# Patient Record
Sex: FEMALE | Race: WHITE | NOT HISPANIC OR LATINO | Employment: FULL TIME | URBAN - METROPOLITAN AREA
[De-identification: names, ages, dates, MRNs, and addresses within clinical notes are randomized per-mention and may not be internally consistent; named-entity substitution may affect disease eponyms.]

---

## 2017-01-30 ENCOUNTER — ALLSCRIPTS OFFICE VISIT (OUTPATIENT)
Dept: OTHER | Facility: OTHER | Age: 67
End: 2017-01-30

## 2017-01-30 DIAGNOSIS — Z13.820 ENCOUNTER FOR SCREENING FOR OSTEOPOROSIS: ICD-10-CM

## 2017-01-30 DIAGNOSIS — Z12.31 ENCOUNTER FOR SCREENING MAMMOGRAM FOR MALIGNANT NEOPLASM OF BREAST: ICD-10-CM

## 2017-02-16 ENCOUNTER — GENERIC CONVERSION - ENCOUNTER (OUTPATIENT)
Dept: OTHER | Facility: OTHER | Age: 67
End: 2017-02-16

## 2018-01-13 VITALS
BODY MASS INDEX: 28.7 KG/M2 | SYSTOLIC BLOOD PRESSURE: 128 MMHG | WEIGHT: 152 LBS | HEIGHT: 61 IN | DIASTOLIC BLOOD PRESSURE: 78 MMHG

## 2018-01-15 NOTE — MISCELLANEOUS
Message   Recorded as Task   Date: 02/16/2016 09:55 AM, Created By: Shen Carmen   Task Name: Care Coordination   Assigned To: Tennille Caceres   Regarding Patient: Schuyler Bridges, Status: In Progress   ToyMiguel Scott - 16 Feb 2016 9:55 AM     TASK CREATED  Caller: Amanda; Other  [facility] in regards to pts rx fo   Estrace-  the [facility] they are asking if it can be transferred to them @ [facility]    phone: [phone]  order number  [id number]    FAX:[phone]   Kimberly Oliva - 16 Feb 2016 10:11 AM     TASK IN PROGRESS   Kimberly Oliva - 16 Feb 2016 10:19 AM     TASK EDITED  rx corrected to read 3 tubes estrace cream with no refills for a 90 day supply        Active Problems    1  Encounter for gynecological examination without abnormal finding (V72 31) (Z01 419)   2  Encounter for routine gynecological examination (V72 31) (Z01 419)   3  Encounter for screening mammogram for malignant neoplasm of breast (V76 12)   (Z12 31)   4  Screening for HPV (human papillomavirus) (V73 81) (Z11 51)   5  Screening for osteoporosis (V82 81) (Z13 820)   6  Vaginitis, atrophic (627 3) (N95 2)   7  Visit for screening mammogram (V76 12) (Z12 31)    Current Meds   1  Calcium TABS; Therapy: (Recorded:09Mau0826) to Recorded   2  Carvedilol 12 5 MG Oral Tablet; Therapy: 31UED1657 to (Last Rx:26Dhs0666)  Requested for: 73Rif8484 Ordered   3  Cymbalta 60 MG Oral Capsule Delayed Release Particles (DULoxetine HCl); Therapy: 08JMB7455 to (Last Rx:71Tcg5166)  Requested for: 33ABZ6543 Ordered   4  Estrace 0 1 MG/GM Vaginal Cream; USE AS DIRECTED; Therapy: 86QFV1556 to (Last Rx:81Ree9713)  Requested for: 00Gvy7371 Ordered   5  OLANZapine 2 5 MG Oral Tablet; Therapy: (Recorded:01Qks0868) to Recorded   6  Pravastatin Sodium 40 MG Oral Tablet; Therapy: 49HYJ2418 to (Last Rx:52Bhk6879)  Requested for: 78Usx3644 Ordered   7  SB Low Dose ASA EC 81 MG Oral Tablet Delayed Release;    Therapy: (Recorded:08May2014) to Recorded   8  Vitamin D 1000 UNIT Oral Tablet; Therapy: (Recorded:08May2014) to Recorded   9  V-R Vitamin C TABS; Therapy: (Recorded:08May2014) to Recorded    Allergies    1  Hepatitis B    2   IVP Dye    Signatures   Electronically signed by : Maria Victoria Becerra, ; Feb 16 2016 10:19AM EST                       (Author)

## 2018-01-15 NOTE — RESULT NOTES
Message   Recorded as Task   Date: 02/24/2017 11:22 AM, Created By: Leticia Palmer   Task Name: Verify Doc   Assigned To: Terri Anguiano   Regarding Patient: Marisol Zhu, Status: Active   CommentCarlyle Creeks - 24 Feb 2017 11:22 AM     Verify task generated in Scan batch basket  Terri Anguiano - 27 Feb 2017 1:14 PM     TASK EDITED  Card was sent a DEXA scan was normal        Signatures   Electronically signed by : Meena Camargo CNM;  Feb 27 2017  1:15PM EST                       (Author)

## 2018-04-12 ENCOUNTER — ANNUAL EXAM (OUTPATIENT)
Dept: OBGYN CLINIC | Facility: MEDICAL CENTER | Age: 68
End: 2018-04-12
Payer: MEDICARE

## 2018-04-12 VITALS
BODY MASS INDEX: 30.58 KG/M2 | DIASTOLIC BLOOD PRESSURE: 70 MMHG | HEIGHT: 61 IN | WEIGHT: 162 LBS | SYSTOLIC BLOOD PRESSURE: 122 MMHG

## 2018-04-12 DIAGNOSIS — Z12.39 ENCOUNTER FOR SCREENING FOR MALIGNANT NEOPLASM OF BREAST: ICD-10-CM

## 2018-04-12 DIAGNOSIS — M85.80 OSTEOPENIA, UNSPECIFIED LOCATION: ICD-10-CM

## 2018-04-12 DIAGNOSIS — Z01.419 ENCOUNTER FOR GYNECOLOGICAL EXAMINATION WITHOUT ABNORMAL FINDING: Primary | ICD-10-CM

## 2018-04-12 PROBLEM — F32.A DEPRESSION: Status: ACTIVE | Noted: 2018-04-12

## 2018-04-12 PROCEDURE — G0101 CA SCREEN;PELVIC/BREAST EXAM: HCPCS | Performed by: NURSE PRACTITIONER

## 2018-04-12 RX ORDER — PRAVASTATIN SODIUM 40 MG
40 TABLET ORAL
Refills: 2 | COMMUNITY
Start: 2018-03-20

## 2018-04-12 RX ORDER — DULOXETIN HYDROCHLORIDE 60 MG/1
CAPSULE, DELAYED RELEASE ORAL
COMMUNITY
Start: 2018-03-30

## 2018-04-12 RX ORDER — CARVEDILOL 12.5 MG/1
12.5 TABLET ORAL DAILY
Refills: 1 | COMMUNITY
Start: 2018-02-11

## 2018-04-12 RX ORDER — OLANZAPINE 2.5 MG/1
TABLET ORAL
COMMUNITY

## 2018-04-12 NOTE — PROGRESS NOTES
[facility]  ANNU[facility] EXAM  Jennifer Zafar 79 y o  SUBJECTIVE    HPI: Jennifer Zafar is a 79 y o   female presenting today for annual GYN exam  Her last gynecologic exam was in  at our practice  Postmenopausal  Patient denies post-menopausal vaginal bleeding  Sexually active with 1 Male partner, monogamous relationship  Declined STI testing today    Denies sexual concerns  Has no breast, bowel, bladder, or vaginal concerns  Works as a personal care aid  Gynecologic History  Last pap smear: Date: 2016 and Result: NILM, HPV(-)  History of abnormal pap smears: patient reports >20y ago  Last mammogram: 2017, BI RADS - 2  Patient reports that she had her annual mammo in 2018 at [facility] and was told that it was normal  It is not on file for viewing today    Contraceptive method: Postmenopausal  Dyspareunia: denies      Obstetric History  [id number]   x2    Health Maintenance  Self-breast exams: no  Calcium & Vitamin D: discussed and written information provided  Tobacco cessation: N/A  DEXA bone density scan: 2017 showed Osteopenia with recommendation for repeat BMD in 2y  Colonoscopy: 10/2017    The following portions of the patient's history were reviewed and updated as appropriate: allergies, current medications, past family history, past medical history, past social history, past surgical history and problem list     ROS  General ROS: negative for chills or fever  Breast ROS: negative for breast lumps  Respiratory ROS: no cough, shortness of breath, or wheezing  Cardiovascular ROS: no chest pain or dyspnea on exertion  Gastrointestinal ROS: no abdominal pain, change in bowel habits, or black or bloody stools  Genito-Urinary ROS: no dysuria, trouble voiding, or hematuria  Neurological ROS: negative    OBJECTIVE  Vitals:    18 0912   BP: 122/70   BP Location: Left arm   Patient Position: Sitting   Weight: 73 5 kg (162 lb) Height: 5' 1" (1 549 m)       Physical Exam   Constitutional: She is oriented to person, place, and time  Vital signs are normal  She appears well-developed and well-nourished  Genitourinary: Vagina normal and uterus normal  Pelvic exam was performed with patient supine  There is no rash, tenderness or lesion on the right labia  There is no rash, tenderness or lesion on the left labia  No erythema in the vagina  No vaginal discharge found  Right adnexum does not display mass and does not display tenderness  Left adnexum does not display mass and does not display tenderness  Cervix does not exhibit motion tenderness  Uterus is not tender  Genitourinary Comments: Atrophic genitalia  Cervix is almost flush with the wall of the uterus  Loss of vaginal rugae  No prolapse noted  HENT:   Head: Normocephalic and atraumatic  Neck: No thyromegaly present  Cardiovascular: Normal heart sounds  Pulmonary/Chest: Effort normal and breath sounds normal  Right breast exhibits no inverted nipple, no mass, no nipple discharge, no skin change and no tenderness  Left breast exhibits no inverted nipple, no mass, no nipple discharge, no skin change and no tenderness  Breasts are symmetrical    Scattered small cherry angiomas across chest    Abdominal: Soft  Normal appearance  Musculoskeletal: Normal range of motion  Lymphadenopathy:     She has no axillary adenopathy  Neurological: She is alert and oriented to person, place, and time  Skin: Skin is warm, dry and intact  Psychiatric: She has a normal mood and affect  Her behavior is normal    Vitals reviewed  ASSESSMENT  Normal postmenopausal female exam  Known osteopenia        Discussion/Counseling  At todays visit I discussed the importance of self-breast exams, exercise and healthy diet  The recommendation is for annual mammograms   Reviewed ASCCP guidelines for cervical cancer screening, which include discontinuing screening after age 72 in the presence of a normal pap smear history  Safe sex practices were strongly encouraged to protect against sexually transmitted infections  Importance of vitamin D and adequate calcium intake was reviewed  All questions were answered to her apparent satisfaction  The patients DEXA bone density scan results were reviewed as well as the morbidity and mortality associated with osteopenia and osteoporosis  I have instructed her to begin taking calcium 1,200mg daily in divided doses along with 800-1,000IU of vitamin D, combined with adequate weight-bearing exercise  Treatment options were reviewed, including oral bisphosphonates, Reclast, HRT, and Evista  The risks, benefits, and side effects of each method were reviewed in detail  All questions were answered to her satisfaction  PLAN  1  Order for annual mammogram provided to patient at todays visit  2  Calcium & Vitamin D supplementation  Weight-bearing exercises  Repeat DEXA scan in February 2019   3  Denies history of abnormal pap smears  No further screening indicated due to age per ASCCP guidelines  4  Return annually for routine GYN exams        All questions were answered & Jakob Bynum expressed understanding        [facility]

## 2020-11-20 DIAGNOSIS — Z12.39 ENCOUNTER FOR SCREENING FOR MALIGNANT NEOPLASM OF BREAST: ICD-10-CM

## 2022-04-20 ENCOUNTER — APPOINTMENT (OUTPATIENT)
Dept: LAB | Facility: CLINIC | Age: 72
End: 2022-04-20
Payer: MEDICARE

## 2022-04-20 DIAGNOSIS — D75.839 THROMBOCYTHEMIA: ICD-10-CM

## 2022-04-20 DIAGNOSIS — E78.5 HYPERLIPOPROTEINEMIA: ICD-10-CM

## 2022-04-20 DIAGNOSIS — E55.9 AVITAMINOSIS D: ICD-10-CM

## 2022-04-20 LAB
25(OH)D3 SERPL-MCNC: 51.7 NG/ML (ref 30–100)
ALBUMIN SERPL BCP-MCNC: 3.9 G/DL (ref 3.5–5)
ALP SERPL-CCNC: 99 U/L (ref 46–116)
ALT SERPL W P-5'-P-CCNC: 27 U/L (ref 12–78)
ANION GAP SERPL CALCULATED.3IONS-SCNC: 3 MMOL/L (ref 4–13)
AST SERPL W P-5'-P-CCNC: 18 U/L (ref 5–45)
BASOPHILS # BLD AUTO: 0.06 THOUSANDS/ΜL (ref 0–0.1)
BASOPHILS NFR BLD AUTO: 1 % (ref 0–1)
BILIRUB SERPL-MCNC: 0.46 MG/DL (ref 0.2–1)
BUN SERPL-MCNC: 18 MG/DL (ref 5–25)
CALCIUM SERPL-MCNC: 9.9 MG/DL (ref 8.3–10.1)
CHLORIDE SERPL-SCNC: 107 MMOL/L (ref 100–108)
CHOLEST SERPL-MCNC: 200 MG/DL
CO2 SERPL-SCNC: 29 MMOL/L (ref 21–32)
CREAT SERPL-MCNC: 0.97 MG/DL (ref 0.6–1.3)
EOSINOPHIL # BLD AUTO: 0.38 THOUSAND/ΜL (ref 0–0.61)
EOSINOPHIL NFR BLD AUTO: 4 % (ref 0–6)
ERYTHROCYTE [DISTWIDTH] IN BLOOD BY AUTOMATED COUNT: 14.1 % (ref 11.6–15.1)
GFR SERPL CREATININE-BSD FRML MDRD: 59 ML/MIN/1.73SQ M
GLUCOSE P FAST SERPL-MCNC: 116 MG/DL (ref 65–99)
HCT VFR BLD AUTO: 45.7 % (ref 34.8–46.1)
HDLC SERPL-MCNC: 47 MG/DL
HGB BLD-MCNC: 14.6 G/DL (ref 11.5–15.4)
IMM GRANULOCYTES # BLD AUTO: 0.03 THOUSAND/UL (ref 0–0.2)
IMM GRANULOCYTES NFR BLD AUTO: 0 % (ref 0–2)
LDLC SERPL CALC-MCNC: 120 MG/DL (ref 0–100)
LYMPHOCYTES # BLD AUTO: 2.45 THOUSANDS/ΜL (ref 0.6–4.47)
LYMPHOCYTES NFR BLD AUTO: 27 % (ref 14–44)
MCH RBC QN AUTO: 28.3 PG (ref 26.8–34.3)
MCHC RBC AUTO-ENTMCNC: 31.9 G/DL (ref 31.4–37.4)
MCV RBC AUTO: 89 FL (ref 82–98)
MONOCYTES # BLD AUTO: 0.9 THOUSAND/ΜL (ref 0.17–1.22)
MONOCYTES NFR BLD AUTO: 10 % (ref 4–12)
NEUTROPHILS # BLD AUTO: 5.34 THOUSANDS/ΜL (ref 1.85–7.62)
NEUTS SEG NFR BLD AUTO: 58 % (ref 43–75)
NONHDLC SERPL-MCNC: 153 MG/DL
NRBC BLD AUTO-RTO: 0 /100 WBCS
PLATELET # BLD AUTO: 473 THOUSANDS/UL (ref 149–390)
PMV BLD AUTO: 9.4 FL (ref 8.9–12.7)
POTASSIUM SERPL-SCNC: 4.3 MMOL/L (ref 3.5–5.3)
PROT SERPL-MCNC: 7.6 G/DL (ref 6.4–8.2)
RBC # BLD AUTO: 5.16 MILLION/UL (ref 3.81–5.12)
SODIUM SERPL-SCNC: 139 MMOL/L (ref 136–145)
TRIGL SERPL-MCNC: 165 MG/DL
WBC # BLD AUTO: 9.16 THOUSAND/UL (ref 4.31–10.16)

## 2022-04-20 PROCEDURE — 85025 COMPLETE CBC W/AUTO DIFF WBC: CPT

## 2022-04-20 PROCEDURE — 36415 COLL VENOUS BLD VENIPUNCTURE: CPT

## 2022-04-20 PROCEDURE — 82306 VITAMIN D 25 HYDROXY: CPT

## 2022-04-20 PROCEDURE — 80061 LIPID PANEL: CPT

## 2022-04-20 PROCEDURE — 80053 COMPREHEN METABOLIC PANEL: CPT

## 2022-09-26 ENCOUNTER — TELEPHONE (OUTPATIENT)
Dept: GASTROENTEROLOGY | Facility: CLINIC | Age: 72
End: 2022-09-26

## 2022-09-26 NOTE — TELEPHONE ENCOUNTER
Recall ltr for colon with Dr Juan A Hernandez due to hx of tva, hyperplastic polyps due on 10/10/22

## 2022-11-09 ENCOUNTER — PREP FOR PROCEDURE (OUTPATIENT)
Dept: GASTROENTEROLOGY | Facility: CLINIC | Age: 72
End: 2022-11-09

## 2022-11-09 ENCOUNTER — TELEPHONE (OUTPATIENT)
Dept: GASTROENTEROLOGY | Facility: CLINIC | Age: 72
End: 2022-11-09

## 2022-11-09 DIAGNOSIS — Z86.010 HISTORY OF COLON POLYPS: Primary | ICD-10-CM

## 2022-11-09 NOTE — TELEPHONE ENCOUNTER
11/09/22  Screened by: Roxanne Galeana    Referring Provider     Pre- Screening: There is no height or weight on file to calculate BMI  Has patient been referred for a routine screening Colonoscopy? yes  Is the patient between 39-70 years old? yes      Previous Colonoscopy yes   If yes:    Date: 10 yrs ago    Facility:     Reason:       SCHEDULING STAFF: If the patient is between 45yrs-49yrs, please advise patient to confirm benefits/coverage with their insurance company for a routine screening colonoscopy, some insurance carriers will only cover at [facility] or [facility]  If the patient is over 66years old, please schedule an office visit  Does the patient want to see a Gastroenterologist prior to their procedure OR are they having any GI symptoms? no    Has the patient been hospitalized or had abdominal surgery in the past 6 months? no    Does the patient use supplemental oxygen? no    Does the patient take Coumadin, Lovenox, Plavix, Elliquis, Xarelto, or other blood thinning medication? no    Has the patient had a stroke, cardiac event, or stent placed in the past year? no     PT PASSED OA  SCHEDULING STAFF: If patient answers NO to above questions, then schedule procedure  If patient answers YES to above questions, then schedule office appointment  If patient is between 45yrs - 49yrs, please advise patient that we will have to confirm benefits & coverage with their insurance company for a routine screening colonoscopy

## 2022-11-09 NOTE — TELEPHONE ENCOUNTER
Scheduled date of colonoscopy (as of today): 12/14/2022    Physician performing colonoscopy: Dr Jenna Jacobson    Location of colonoscopy: [facility]    Bowel prep reviewed with patient: Miralax/ Dulcolax    Instructions reviewed with patient by: AS    Clearances: N/A

## 2022-11-09 NOTE — TELEPHONE ENCOUNTER
Adria Canela 27 Assessment    Name: Fannie Flores  YOB: 1950  Last Height: 5' 2"  Last weight: 170 lb  BMI: None  Procedure: Colon  Diagnosis: Hx of polyps  Date of procedure: 12/14/2022  Prep: Miralax/Dulcolax  Responsible : Romaine Milton (daughter)  Phone#: [phone]  Name completing form: Cesar Morris  Date form completed: 11/09/22      If the patient answers yes to any of these questions, schedule in a hospital  Are you pregnant: No  Do you rely on a wheelchair for mobility: No  Have you been diagnosed with End Stage Renal Disease (ESRD): No  Do you need oxygen during the day: No  Have you had a heart attack or stroke within the past three months: No  Have you had a seizure within the past three months: No  Have you ever been informed by anesthesia that you have a difficult airway: No  Additional Questions  Have you had any cardiac testing or are under the care of a Cardiologist (see cardiac list): No  Cardiac list:   Do you have an implanted cardiac defibrillator: No (Comment: This patient should be scheduled in the hospital)    Have any bleeding problems, such as anemia or hemophilia (If patient has H&H result below 8, schedule in hospital   H&H must be within 30 days of procedure): No    Had an organ transplant within the past 3 months: No    Do you have any present infections: Yes  Do you get short of breath when walking a few blocks: No  Have you been diagnosed with diabetes:   Comments (provide cardiac provider information if applicable): Pt has infection in tooth  Pt states she will be finished with her antiobiotics in 2 days

## 2022-12-05 ENCOUNTER — TELEPHONE (OUTPATIENT)
Dept: GASTROENTEROLOGY | Facility: CLINIC | Age: 72
End: 2022-12-05

## 2022-12-07 NOTE — TELEPHONE ENCOUNTER
lmom confirming pt's colonoscopy scheduled on 12/14/22 at [facility] with Dr Loco Mcclure   Informed [facility] would be calling 1-2 days prior with the arrival time  Informed of clear liquid diet day prior as well as the bowel cleansing preparation  Informed would need a  the day of the procedure due to being under sedation  I asked pt to please call back if has not received instructions or if has any questions

## 2022-12-14 ENCOUNTER — ANESTHESIA EVENT (OUTPATIENT)
Dept: GASTROENTEROLOGY | Facility: AMBULATORY SURGERY CENTER | Age: 72
End: 2022-12-14

## 2022-12-14 ENCOUNTER — ANESTHESIA (OUTPATIENT)
Dept: GASTROENTEROLOGY | Facility: AMBULATORY SURGERY CENTER | Age: 72
End: 2022-12-14

## 2022-12-14 ENCOUNTER — HOSPITAL ENCOUNTER (OUTPATIENT)
Dept: GASTROENTEROLOGY | Facility: AMBULATORY SURGERY CENTER | Age: 72
Discharge: HOME | End: 2022-12-14

## 2022-12-14 VITALS
HEIGHT: 62 IN | TEMPERATURE: 97.9 F | WEIGHT: 170 LBS | HEART RATE: 63 BPM | DIASTOLIC BLOOD PRESSURE: 64 MMHG | RESPIRATION RATE: 18 BRPM | OXYGEN SATURATION: 97 % | BODY MASS INDEX: 31.28 KG/M2 | SYSTOLIC BLOOD PRESSURE: 155 MMHG

## 2022-12-14 DIAGNOSIS — Z86.010 HISTORY OF COLON POLYPS: ICD-10-CM

## 2022-12-14 RX ORDER — SODIUM CHLORIDE, SODIUM LACTATE, POTASSIUM CHLORIDE, CALCIUM CHLORIDE 600; 310; 30; 20 MG/100ML; MG/100ML; MG/100ML; MG/100ML
100 INJECTION, SOLUTION INTRAVENOUS CONTINUOUS
Status: CANCELLED | OUTPATIENT
Start: 2022-12-14

## 2022-12-14 RX ORDER — PROPOFOL 10 MG/ML
INJECTION, EMULSION INTRAVENOUS AS NEEDED
Status: DISCONTINUED | OUTPATIENT
Start: 2022-12-14 | End: 2022-12-14

## 2022-12-14 RX ORDER — ALPRAZOLAM 0.5 MG/1
0.5 TABLET ORAL 3 TIMES DAILY PRN
COMMUNITY
Start: 2022-11-28

## 2022-12-14 RX ORDER — LIDOCAINE HYDROCHLORIDE 10 MG/ML
INJECTION, SOLUTION EPIDURAL; INFILTRATION; INTRACAUDAL; PERINEURAL AS NEEDED
Status: DISCONTINUED | OUTPATIENT
Start: 2022-12-14 | End: 2022-12-14

## 2022-12-14 RX ORDER — SODIUM CHLORIDE, SODIUM LACTATE, POTASSIUM CHLORIDE, CALCIUM CHLORIDE 600; 310; 30; 20 MG/100ML; MG/100ML; MG/100ML; MG/100ML
100 INJECTION, SOLUTION INTRAVENOUS CONTINUOUS
Status: DISCONTINUED | OUTPATIENT
Start: 2022-12-14 | End: 2022-12-18 | Stop reason: HOSPADM

## 2022-12-14 RX ADMIN — PROPOFOL 30 MG: 10 INJECTION, EMULSION INTRAVENOUS at 11:12

## 2022-12-14 RX ADMIN — PROPOFOL 30 MG: 10 INJECTION, EMULSION INTRAVENOUS at 11:02

## 2022-12-14 RX ADMIN — PROPOFOL 100 MG: 10 INJECTION, EMULSION INTRAVENOUS at 10:53

## 2022-12-14 RX ADMIN — PROPOFOL 40 MG: 10 INJECTION, EMULSION INTRAVENOUS at 10:57

## 2022-12-14 RX ADMIN — LIDOCAINE HYDROCHLORIDE 50 MG: 10 INJECTION, SOLUTION EPIDURAL; INFILTRATION; INTRACAUDAL; PERINEURAL at 10:53

## 2022-12-14 RX ADMIN — PROPOFOL 40 MG: 10 INJECTION, EMULSION INTRAVENOUS at 11:07

## 2022-12-14 RX ADMIN — SODIUM CHLORIDE, SODIUM LACTATE, POTASSIUM CHLORIDE, CALCIUM CHLORIDE: 600; 310; 30; 20 INJECTION, SOLUTION INTRAVENOUS at 10:50

## 2022-12-14 NOTE — ANESTHESIA PREPROCEDURE EVALUATION
Procedure:  COLONOSCOPY    Relevant Problems   NEURO/PSYCH   (+) Depression        Physical Exam    Airway    Mallampati score: III  TM Distance: >3 FB  Neck ROM: full     Dental   No notable dental hx     Cardiovascular  Cardiovascular exam normal    Pulmonary  Pulmonary exam normal     Other Findings        Anesthesia Plan  ASA Score- 2     Anesthesia Type- IV sedation with anesthesia with ASA Monitors  Additional Monitors:   Airway Plan:           Plan Factors-Exercise tolerance (METS): >4 METS  Chart reviewed  EKG reviewed  Existing labs reviewed  Patient summary reviewed  Patient is not a current smoker  Induction-     Postoperative Plan-     Informed Consent- Anesthetic plan and risks discussed with patient  I personally reviewed this patient with the CRNA  Discussed and agreed on the Anesthesia Plan with the CRNA  Shabana Rodriguez

## 2022-12-14 NOTE — H&P
History and Physical - [facility]  Carlos Charlton 81 y o  female MRN: [id number]                  HPI: Carlos Charlton is a 81y o  year old female who presents for history of polyp      REVIEW OF SYSTEMS: Per the HPI, and otherwise unremarkable      Historical Information   Past Medical History:   Diagnosis Date   • Anxiety    • Arthritis     B/L knees   • Colon polyp    • Female cystocele    • Gallbladder disease    • Hyperlipidemia    • Hypertension    • Rectal polyp      Past Surgical History:   Procedure Laterality Date   • APPENDECTOMY     • BLADDER SUSPENSION     • CHOLECYSTECTOMY     • COLONOSCOPY     • TUBAL LIGATION       Social History   Social History     Substance and Sexual Activity   Alcohol Use Yes   • Alcohol/week: 2 0 - 3 0 standard drinks   • Types: 2 - 3 Glasses of wine per week    Comment: wine     Social History     Substance and Sexual Activity   Drug Use No     Social History     Tobacco Use   Smoking Status Never   Smokeless Tobacco Never     Family History   Problem Relation Age of Onset   • No Known Problems Mother    • No Known Problems Father    • Lung cancer Sister    • Heart failure Brother    • No Known Problems Maternal Grandmother    • No Known Problems Maternal Grandfather    • No Known Problems Paternal Grandmother    • No Known Problems Paternal Grandfather    • Diabetes Family    • Heart disease Family    • Hypertension Family    • Kidney disease Family    • Thyroid disease Family        Meds/Allergies       Current Outpatient Medications:   •  ALPRAZolam (XANAX) 0 5 mg tablet  •  Aspirin (ASPIR-81 PO)  •  calcium carbonate 1250 MG capsule  •  carvedilol (COREG) 12 5 mg tablet  •  DULoxetine (CYMBALTA) 60 mg delayed release capsule  •  OLANZapine (ZyPREXA) 2 5 mg tablet  •  pravastatin (PRAVACHOL) 40 mg tablet    Current Facility-Administered Medications:   •  lactated ringers infusion, 100 mL/hr, Intravenous, Continuous    Allergies   Allergen Reactions   • Hepatitis B Immune Globulin Hives   • Iodinated Diagnostic Agents Hives       Objective     /82   Pulse 69   Temp 97 9 °F (36 6 °C) (Skin)   Resp 18   Ht 5' 2" (1 575 m)   Wt 77 1 kg (170 lb)   SpO2 98%   BMI 31 09 kg/m²       PHYSICAL EXAM    Gen: NAD  Head: NCAT  CV: RRR  CHEST: Clear  ABD: soft, NT/ND  EXT: no edema      ASSESSMENT/PLAN:  This is a 81y o  year old female here for colonoscopy, and she is stable and optimized for her procedure

## 2022-12-14 NOTE — ANESTHESIA POSTPROCEDURE EVALUATION
Post-Op Assessment Note    CV Status:  Stable  Pain Score: 0    Pain management: adequate     Mental Status:  Alert and awake   Hydration Status:  Euvolemic and stable   PONV Controlled:  Controlled   Airway Patency:  Patent      Post Op Vitals Reviewed: Yes      Staff: CRNA         No notable events documented      /80 (12/14/22 1118)    Temp      Pulse 75 (12/14/22 1118)   Resp 18 (12/14/22 1118)    SpO2 96 % (12/14/22 1118)

## 2023-07-25 ENCOUNTER — OFFICE VISIT (OUTPATIENT)
Dept: OBGYN CLINIC | Facility: MEDICAL CENTER | Age: 73
End: 2023-07-25
Payer: MEDICARE

## 2023-07-25 VITALS
SYSTOLIC BLOOD PRESSURE: 132 MMHG | DIASTOLIC BLOOD PRESSURE: 88 MMHG | WEIGHT: 174 LBS | BODY MASS INDEX: 32.85 KG/M2 | HEIGHT: 61 IN

## 2023-07-25 DIAGNOSIS — Z78.0 ENCOUNTER FOR OSTEOPOROSIS SCREENING IN ASYMPTOMATIC POSTMENOPAUSAL PATIENT: ICD-10-CM

## 2023-07-25 DIAGNOSIS — N39.3 SUI (STRESS URINARY INCONTINENCE, FEMALE): ICD-10-CM

## 2023-07-25 DIAGNOSIS — Z12.31 ENCOUNTER FOR SCREENING MAMMOGRAM FOR MALIGNANT NEOPLASM OF BREAST: ICD-10-CM

## 2023-07-25 DIAGNOSIS — Z13.820 ENCOUNTER FOR OSTEOPOROSIS SCREENING IN ASYMPTOMATIC POSTMENOPAUSAL PATIENT: ICD-10-CM

## 2023-07-25 DIAGNOSIS — Z12.4 ENCOUNTER FOR PAPANICOLAOU SMEAR FOR CERVICAL CANCER SCREENING: ICD-10-CM

## 2023-07-25 DIAGNOSIS — Z01.411 ENCNTR FOR GYN EXAM (GENERAL) (ROUTINE) W ABNORMAL FINDINGS: Primary | ICD-10-CM

## 2023-07-25 PROCEDURE — G0145 SCR C/V CYTO,THINLAYER,RESCR: HCPCS | Performed by: NURSE PRACTITIONER

## 2023-07-25 PROCEDURE — G0101 CA SCREEN;PELVIC/BREAST EXAM: HCPCS | Performed by: NURSE PRACTITIONER

## 2023-07-25 NOTE — PROGRESS NOTES
Assessment/Plan:  Calcium 1200-1500mg (in divided doses-max 600 mg at one time) + 800-1000 IU Vit D daily unless otherwise directed. Avoid falls. Exercise 150 minutes per week minimum including weight bearing exercises. DEXA scan- ordered. Pap completed today. No further paps after this pap as long as there has been adequate normal paps completed, no history of RENE 2  or a more severe pap diagnosis in the last 20 years. Annual mammogram ordered and monthly breast self exam recommended . Colonoscopy-   Due         Kegels 20 times twice daily. UA CS ordered. States hx of hematuria. Vaginal moisturizers twice weekly as needed. Return to office in one year or sooner, if needed. 1. Encntr for gyn exam (general) (routine) w abnormal findings    2. Encounter for Papanicolaou smear for cervical cancer screening  -     Liquid-based pap, screening    3. Encounter for screening mammogram for malignant neoplasm of breast  -     Mammo screening bilateral w 3d & cad; Future    4. Encounter for osteoporosis screening in asymptomatic postmenopausal patient  -     DXA bone density spine hip and pelvis; Future    5. CARMEN (stress urinary incontinence, female)  -     Urine culture; Future  -     Urinalysis with microscopic; Future    6. BMI 33.0-33.9,adult               Subjective:      Patient ID: Nir Verdugo is a 72 y.o. female. HPI    Nir Verdugo is a 72 y.o. [id number] ( 56 yo girl-stomach issues, 44 yo boy-back and shoulder concerns ) female who is here today for her annual visit. Noticed "white bumps" on her vulva 1-2 years ago. Non tender but would like them evaluated today. Menopausal with no vaginal bleeding. Exercise- every other day. Just restarted. Works PT as a home health aide  Nir Verdugo is not sexually active as she is  from her 2nd  since 2. Her last partner  3 years ago. Denies vaginal pain,bleeding or dryness.     She uses tubal ligation/menopause  for contraception. She is not interested in STD screening today. She denies vaginal discharge, itching or pelvic pain. She has no urinary concerns, does have CARMEN. States hx of hematuria. No bowel concerns. No breast concerns. Last pap: 1/25/16 normal pap with negative HR HPV. No abnormal pap in last 15-20 years. Possibly not consistent paps. DEXA scan: 2/2017 noted in provider note stating osteopenia. Results not available. Mammogram: 11/18/2021 normal with heterogeneously dense breast tissue; states had recent normal mammogram months ago at [facility]. Colonoscopy: 12/14/2022 recall in 5 years. Family history of cancer:   Cancer-related family history includes Lung cancer in her sister. There is no history of Breast cancer, Colon cancer, Ovarian cancer, Uterine cancer, or Cervical cancer. The following portions of the patient's history were reviewed and updated as appropriate: allergies, current medications, past family history, past medical history, past social history, past surgical history and problem list.    Review of Systems   Constitutional: Negative. Negative for activity change, appetite change, chills, diaphoresis, fatigue, fever and unexpected weight change. HENT: Negative for congestion, dental problem, sneezing, sore throat and trouble swallowing. Eyes: Negative for visual disturbance. Respiratory: Negative for chest tightness and shortness of breath. Cardiovascular: Negative for chest pain and leg swelling. Gastrointestinal: Negative for abdominal pain, constipation, diarrhea, nausea and vomiting. Genitourinary: Negative for difficulty urinating, dysuria, frequency, hematuria, pelvic pain, urgency, vaginal bleeding, vaginal discharge and vaginal pain. White vulvar bumps   Musculoskeletal: Negative for back pain and neck pain. Skin: Negative. Allergic/Immunologic: Negative. Neurological: Negative for weakness and headaches. Hematological: Negative for adenopathy. Psychiatric/Behavioral: Negative. Objective:      /88 (BP Location: Left arm, Patient Position: Sitting, Cuff Size: Standard)   Ht 5' 0.5" (1.537 m)   Wt 78.9 kg (174 lb)   BMI 33.42 kg/m²          Physical Exam  Vitals and nursing note reviewed. Constitutional:       Appearance: Normal appearance. She is well-developed. She is obese. HENT:      Head: Normocephalic. Neck:      Thyroid: No thyromegaly. Cardiovascular:      Rate and Rhythm: Normal rate and regular rhythm. Heart sounds: Normal heart sounds. Pulmonary:      Effort: Pulmonary effort is normal.      Breath sounds: Normal breath sounds. Chest:   Breasts:     Breasts are symmetrical.      Right: Normal. No inverted nipple, mass, nipple discharge, skin change or tenderness. Left: Normal. No inverted nipple, mass, nipple discharge, skin change or tenderness. Abdominal:      Palpations: Abdomen is soft. Genitourinary:     General: Normal vulva. Exam position: Lithotomy position. Labia:         Right: No rash, tenderness, lesion or injury. Left: No rash, tenderness, lesion or injury. Urethra: No prolapse, urethral pain, urethral swelling or urethral lesion. Vagina: No signs of injury and foreign body. No vaginal discharge, erythema, tenderness, bleeding, lesions or prolapsed vaginal walls. Cervix: Normal.      Uterus: Normal.       Adnexa: Right adnexa normal and left adnexa normal.        Right: No mass, tenderness or fullness. Left: No mass, tenderness or fullness. Rectum: No external hemorrhoid. Comments: Vulvovaginal atrophy  Small sebaceous cysts noted on left labia majora  Musculoskeletal:         General: Normal range of motion. Cervical back: Normal range of motion. Lymphadenopathy:      Head:      Right side of head: No submental, submandibular, tonsillar or occipital adenopathy.       Left side of head: No submental, submandibular, tonsillar or occipital adenopathy. Upper Body:      Right upper body: No supraclavicular or axillary adenopathy. Left upper body: No supraclavicular or axillary adenopathy. Lower Body: No right inguinal adenopathy. No left inguinal adenopathy. Skin:     General: Skin is warm and dry. Neurological:      Mental Status: She is alert and oriented to person, place, and time. Psychiatric:         Mood and Affect: Mood normal.         Behavior: Behavior normal. Behavior is cooperative.

## 2023-07-25 NOTE — PATIENT INSTRUCTIONS
Calcium 1200-1500mg (in divided doses-max 600 mg at one time) + 800-1000 IU Vit D daily unless otherwise directed. Avoid falls. Exercise 150 minutes per week minimum including weight bearing exercises. DEXA scan- ordered. Pap completed today. No further paps after this pap as long as there has been adequate normal paps completed, no history of RENE 2  or a more severe pap diagnosis in the last 20 years. Annual mammogram ordered and monthly breast self exam recommended . Colonoscopy-   Due 2027        Kegels 20 times twice daily. UA CS ordered. States hx of hematuria. Vaginal moisturizers twice weekly as needed. Return to office in one year or sooner, if needed.

## 2023-07-31 LAB
LAB AP GYN PRIMARY INTERPRETATION: NORMAL
Lab: NORMAL

## 2023-09-29 ENCOUNTER — LAB (OUTPATIENT)
Dept: LAB | Facility: CLINIC | Age: 73
End: 2023-09-29
Payer: MEDICARE

## 2023-09-29 ENCOUNTER — TRANSCRIBE ORDERS (OUTPATIENT)
Dept: LAB | Facility: CLINIC | Age: 73
End: 2023-09-29

## 2023-09-29 DIAGNOSIS — E03.9 HYPOTHYROIDISM, ADULT: ICD-10-CM

## 2023-09-29 DIAGNOSIS — I48.91 ATRIAL FIBRILLATION, UNSPECIFIED TYPE (HCC): Primary | ICD-10-CM

## 2023-09-29 DIAGNOSIS — N39.3 SUI (STRESS URINARY INCONTINENCE, FEMALE): ICD-10-CM

## 2023-09-29 DIAGNOSIS — D75.839 THROMBOCYTOSIS: ICD-10-CM

## 2023-09-29 DIAGNOSIS — E78.5 HYPERLIPIDEMIA, UNSPECIFIED HYPERLIPIDEMIA TYPE: ICD-10-CM

## 2023-09-29 DIAGNOSIS — I48.91 ATRIAL FIBRILLATION, UNSPECIFIED TYPE (HCC): ICD-10-CM

## 2023-09-29 LAB
ALBUMIN SERPL BCP-MCNC: 4.1 G/DL (ref 3.5–5)
ALP SERPL-CCNC: 91 U/L (ref 34–104)
ALT SERPL W P-5'-P-CCNC: 35 U/L (ref 7–52)
ANION GAP SERPL CALCULATED.3IONS-SCNC: 6 MMOL/L
AST SERPL W P-5'-P-CCNC: 36 U/L (ref 13–39)
BACTERIA UR QL AUTO: NORMAL /HPF
BASOPHILS # BLD AUTO: 0.07 THOUSANDS/ÂΜL (ref 0–0.1)
BASOPHILS NFR BLD AUTO: 1 % (ref 0–1)
BILIRUB SERPL-MCNC: 0.43 MG/DL (ref 0.2–1)
BILIRUB UR QL STRIP: NEGATIVE
BUN SERPL-MCNC: 19 MG/DL (ref 5–25)
CALCIUM SERPL-MCNC: 10 MG/DL (ref 8.4–10.2)
CHLORIDE SERPL-SCNC: 104 MMOL/L (ref 96–108)
CHOLEST SERPL-MCNC: 185 MG/DL
CLARITY UR: CLEAR
CO2 SERPL-SCNC: 30 MMOL/L (ref 21–32)
COLOR UR: NORMAL
CREAT SERPL-MCNC: 0.95 MG/DL (ref 0.6–1.3)
EOSINOPHIL # BLD AUTO: 0.35 THOUSAND/ÂΜL (ref 0–0.61)
EOSINOPHIL NFR BLD AUTO: 4 % (ref 0–6)
ERYTHROCYTE [DISTWIDTH] IN BLOOD BY AUTOMATED COUNT: 14 % (ref 11.6–15.1)
GFR SERPL CREATININE-BSD FRML MDRD: 60 ML/MIN/1.73SQ M
GLUCOSE P FAST SERPL-MCNC: 110 MG/DL (ref 65–99)
GLUCOSE UR STRIP-MCNC: NEGATIVE MG/DL
HCT VFR BLD AUTO: 45 % (ref 34.8–46.1)
HDLC SERPL-MCNC: 50 MG/DL
HGB BLD-MCNC: 14.3 G/DL (ref 11.5–15.4)
HGB UR QL STRIP.AUTO: NEGATIVE
IMM GRANULOCYTES # BLD AUTO: 0.05 THOUSAND/UL (ref 0–0.2)
IMM GRANULOCYTES NFR BLD AUTO: 1 % (ref 0–2)
KETONES UR STRIP-MCNC: NEGATIVE MG/DL
LDLC SERPL CALC-MCNC: 99 MG/DL (ref 0–100)
LEUKOCYTE ESTERASE UR QL STRIP: NEGATIVE
LYMPHOCYTES # BLD AUTO: 2.21 THOUSANDS/ÂΜL (ref 0.6–4.47)
LYMPHOCYTES NFR BLD AUTO: 25 % (ref 14–44)
MCH RBC QN AUTO: 28.8 PG (ref 26.8–34.3)
MCHC RBC AUTO-ENTMCNC: 31.8 G/DL (ref 31.4–37.4)
MCV RBC AUTO: 91 FL (ref 82–98)
MONOCYTES # BLD AUTO: 0.86 THOUSAND/ÂΜL (ref 0.17–1.22)
MONOCYTES NFR BLD AUTO: 10 % (ref 4–12)
NEUTROPHILS # BLD AUTO: 5.46 THOUSANDS/ÂΜL (ref 1.85–7.62)
NEUTS SEG NFR BLD AUTO: 59 % (ref 43–75)
NITRITE UR QL STRIP: NEGATIVE
NON-SQ EPI CELLS URNS QL MICRO: NORMAL /HPF
NONHDLC SERPL-MCNC: 135 MG/DL
NRBC BLD AUTO-RTO: 0 /100 WBCS
PH UR STRIP.AUTO: 7 [PH]
PLATELET # BLD AUTO: 438 THOUSANDS/UL (ref 149–390)
PMV BLD AUTO: 9.7 FL (ref 8.9–12.7)
POTASSIUM SERPL-SCNC: 4.6 MMOL/L (ref 3.5–5.3)
PROT SERPL-MCNC: 7.3 G/DL (ref 6.4–8.4)
PROT UR STRIP-MCNC: NEGATIVE MG/DL
RBC # BLD AUTO: 4.96 MILLION/UL (ref 3.81–5.12)
RBC #/AREA URNS AUTO: NORMAL /HPF
SODIUM SERPL-SCNC: 140 MMOL/L (ref 135–147)
SP GR UR STRIP.AUTO: 1.01 (ref 1–1.03)
TRIGL SERPL-MCNC: 181 MG/DL
UROBILINOGEN UR STRIP-ACNC: <2 MG/DL
WBC # BLD AUTO: 9 THOUSAND/UL (ref 4.31–10.16)
WBC #/AREA URNS AUTO: NORMAL /HPF

## 2023-09-29 PROCEDURE — 81001 URINALYSIS AUTO W/SCOPE: CPT

## 2023-09-29 PROCEDURE — 87086 URINE CULTURE/COLONY COUNT: CPT

## 2023-09-29 PROCEDURE — 36415 COLL VENOUS BLD VENIPUNCTURE: CPT

## 2023-09-29 PROCEDURE — 85025 COMPLETE CBC W/AUTO DIFF WBC: CPT

## 2023-09-29 PROCEDURE — 80053 COMPREHEN METABOLIC PANEL: CPT

## 2023-09-29 PROCEDURE — 80061 LIPID PANEL: CPT

## 2023-09-30 LAB — BACTERIA UR CULT: NORMAL

## 2023-10-02 ENCOUNTER — TELEPHONE (OUTPATIENT)
Dept: OBGYN CLINIC | Facility: CLINIC | Age: 73
End: 2023-10-02

## 2023-10-02 NOTE — TELEPHONE ENCOUNTER
----- Message from Agustin Guzman MD sent at 9/29/2023  5:28 PM EDT -----  The urine analysis is normal

## 2023-10-10 ENCOUNTER — HOSPITAL ENCOUNTER (OUTPATIENT)
Dept: RADIOLOGY | Facility: MEDICAL CENTER | Age: 73
Discharge: HOME | End: 2023-10-10
Payer: MEDICARE

## 2023-10-10 DIAGNOSIS — Z78.0 ENCOUNTER FOR OSTEOPOROSIS SCREENING IN ASYMPTOMATIC POSTMENOPAUSAL PATIENT: ICD-10-CM

## 2023-10-10 DIAGNOSIS — Z13.820 ENCOUNTER FOR OSTEOPOROSIS SCREENING IN ASYMPTOMATIC POSTMENOPAUSAL PATIENT: ICD-10-CM

## 2023-10-10 PROCEDURE — 77080 DXA BONE DENSITY AXIAL: CPT

## 2023-11-07 ENCOUNTER — APPOINTMENT (OUTPATIENT)
Dept: RADIOLOGY | Facility: MEDICAL CENTER | Age: 73
End: 2023-11-07
Payer: MEDICARE

## 2023-11-07 DIAGNOSIS — R05.9 COUGH, UNSPECIFIED TYPE: ICD-10-CM

## 2023-11-07 PROCEDURE — 71046 X-RAY EXAM CHEST 2 VIEWS: CPT

## 2024-09-04 ENCOUNTER — APPOINTMENT (OUTPATIENT)
Dept: RADIOLOGY | Facility: MEDICAL CENTER | Age: 74
End: 2024-09-04
Payer: MEDICARE

## 2024-09-04 ENCOUNTER — HOSPITAL ENCOUNTER (OUTPATIENT)
Dept: RADIOLOGY | Facility: MEDICAL CENTER | Age: 74
Discharge: HOME | End: 2024-09-04
Payer: MEDICARE

## 2024-09-04 VITALS — HEIGHT: 61 IN | WEIGHT: 169 LBS | BODY MASS INDEX: 31.91 KG/M2

## 2024-09-04 DIAGNOSIS — Z12.31 ENCOUNTER FOR SCREENING MAMMOGRAM FOR MALIGNANT NEOPLASM OF BREAST: ICD-10-CM

## 2024-09-04 PROCEDURE — 77067 SCR MAMMO BI INCL CAD: CPT

## 2024-09-04 PROCEDURE — 77063 BREAST TOMOSYNTHESIS BI: CPT

## 2024-11-19 ENCOUNTER — APPOINTMENT (OUTPATIENT)
Dept: LAB | Facility: CLINIC | Age: 74
End: 2024-11-19
Payer: MEDICARE

## 2024-11-19 ENCOUNTER — TRANSCRIBE ORDERS (OUTPATIENT)
Dept: LAB | Facility: CLINIC | Age: 74
End: 2024-11-19

## 2024-11-19 DIAGNOSIS — E03.9 MYXEDEMA HEART DISEASE: ICD-10-CM

## 2024-11-19 DIAGNOSIS — I10 ESSENTIAL HYPERTENSION, MALIGNANT: ICD-10-CM

## 2024-11-19 DIAGNOSIS — E53.8 VITAMIN B12 DEFICIENCY: ICD-10-CM

## 2024-11-19 DIAGNOSIS — Z13.0 SCREENING FOR IRON DEFICIENCY ANEMIA: ICD-10-CM

## 2024-11-19 DIAGNOSIS — Z13.0 SCREENING FOR IRON DEFICIENCY ANEMIA: Primary | ICD-10-CM

## 2024-11-19 DIAGNOSIS — I51.9 MYXEDEMA HEART DISEASE: ICD-10-CM

## 2024-11-19 DIAGNOSIS — E55.9 AVITAMINOSIS D: ICD-10-CM

## 2024-11-19 DIAGNOSIS — E78.5 HYPERLIPIDEMIA, UNSPECIFIED HYPERLIPIDEMIA TYPE: ICD-10-CM

## 2024-11-19 DIAGNOSIS — R73.09 IMPAIRED GLUCOSE TOLERANCE TEST: ICD-10-CM

## 2024-11-19 LAB
25(OH)D3 SERPL-MCNC: 59.4 NG/ML (ref 30–100)
ALBUMIN SERPL BCG-MCNC: 4.3 G/DL (ref 3.5–5)
ALP SERPL-CCNC: 88 U/L (ref 34–104)
ALT SERPL W P-5'-P-CCNC: 18 U/L (ref 7–52)
ANION GAP SERPL CALCULATED.3IONS-SCNC: 10 MMOL/L (ref 4–13)
AST SERPL W P-5'-P-CCNC: 20 U/L (ref 13–39)
BILIRUB SERPL-MCNC: 0.51 MG/DL (ref 0.2–1)
BUN SERPL-MCNC: 18 MG/DL (ref 5–25)
CALCIUM SERPL-MCNC: 9.9 MG/DL (ref 8.4–10.2)
CHLORIDE SERPL-SCNC: 102 MMOL/L (ref 96–108)
CHOLEST SERPL-MCNC: 172 MG/DL (ref ?–200)
CO2 SERPL-SCNC: 29 MMOL/L (ref 21–32)
CREAT SERPL-MCNC: 0.92 MG/DL (ref 0.6–1.3)
ERYTHROCYTE [DISTWIDTH] IN BLOOD BY AUTOMATED COUNT: 13.6 % (ref 11.6–15.1)
EST. AVERAGE GLUCOSE BLD GHB EST-MCNC: 128 MG/DL
GFR SERPL CREATININE-BSD FRML MDRD: 62 ML/MIN/1.73SQ M
GLUCOSE P FAST SERPL-MCNC: 124 MG/DL (ref 65–99)
HBA1C MFR BLD: 6.1 %
HCT VFR BLD AUTO: 43.7 % (ref 34.8–46.1)
HDLC SERPL-MCNC: 43 MG/DL
HGB BLD-MCNC: 14.3 G/DL (ref 11.5–15.4)
LDLC SERPL CALC-MCNC: 97 MG/DL (ref 0–100)
MCH RBC QN AUTO: 29.5 PG (ref 26.8–34.3)
MCHC RBC AUTO-ENTMCNC: 32.7 G/DL (ref 31.4–37.4)
MCV RBC AUTO: 90 FL (ref 82–98)
NONHDLC SERPL-MCNC: 129 MG/DL
PLATELET # BLD AUTO: 424 THOUSANDS/UL (ref 149–390)
PMV BLD AUTO: 9.7 FL (ref 8.9–12.7)
POTASSIUM SERPL-SCNC: 4.2 MMOL/L (ref 3.5–5.3)
PROT SERPL-MCNC: 7.1 G/DL (ref 6.4–8.4)
RBC # BLD AUTO: 4.84 MILLION/UL (ref 3.81–5.12)
SODIUM SERPL-SCNC: 141 MMOL/L (ref 135–147)
TRIGL SERPL-MCNC: 159 MG/DL (ref ?–150)
TSH SERPL DL<=0.05 MIU/L-ACNC: 2.94 UIU/ML (ref 0.45–4.5)
VIT B12 SERPL-MCNC: 552 PG/ML (ref 180–914)
WBC # BLD AUTO: 9.24 THOUSAND/UL (ref 4.31–10.16)

## 2024-11-19 PROCEDURE — 82306 VITAMIN D 25 HYDROXY: CPT

## 2024-11-19 PROCEDURE — 36415 COLL VENOUS BLD VENIPUNCTURE: CPT

## 2024-11-19 PROCEDURE — 85027 COMPLETE CBC AUTOMATED: CPT

## 2024-11-19 PROCEDURE — 80053 COMPREHEN METABOLIC PANEL: CPT

## 2024-11-19 PROCEDURE — 83036 HEMOGLOBIN GLYCOSYLATED A1C: CPT

## 2024-11-19 PROCEDURE — 82607 VITAMIN B-12: CPT

## 2024-11-19 PROCEDURE — 84443 ASSAY THYROID STIM HORMONE: CPT

## 2024-11-19 PROCEDURE — 80061 LIPID PANEL: CPT
